# Patient Record
Sex: FEMALE | Race: AMERICAN INDIAN OR ALASKA NATIVE
[De-identification: names, ages, dates, MRNs, and addresses within clinical notes are randomized per-mention and may not be internally consistent; named-entity substitution may affect disease eponyms.]

---

## 2019-06-13 ENCOUNTER — HOSPITAL ENCOUNTER (EMERGENCY)
Dept: HOSPITAL 5 - ED | Age: 29
Discharge: HOME | End: 2019-06-13
Payer: MEDICAID

## 2019-06-13 VITALS — SYSTOLIC BLOOD PRESSURE: 149 MMHG | DIASTOLIC BLOOD PRESSURE: 91 MMHG

## 2019-06-13 DIAGNOSIS — I10: ICD-10-CM

## 2019-06-13 DIAGNOSIS — R04.0: Primary | ICD-10-CM

## 2019-06-13 LAB
APTT BLD: 28 SEC. (ref 24.2–36.6)
BASOPHILS # (AUTO): 0 K/MM3 (ref 0–0.1)
BASOPHILS NFR BLD AUTO: 1 % (ref 0–1.8)
EOSINOPHIL # BLD AUTO: 0 K/MM3 (ref 0–0.4)
EOSINOPHIL NFR BLD AUTO: 1 % (ref 0–4.3)
HCT VFR BLD CALC: 37.4 % (ref 30.3–42.9)
HGB BLD-MCNC: 12.8 GM/DL (ref 10.1–14.3)
INR PPP: 1.09 (ref 0.87–1.13)
LYMPHOCYTES # BLD AUTO: 1.3 K/MM3 (ref 1.2–5.4)
LYMPHOCYTES NFR BLD AUTO: 29.7 % (ref 13.4–35)
MCHC RBC AUTO-ENTMCNC: 34 % (ref 30–34)
MCV RBC AUTO: 113 FL (ref 79–97)
MONOCYTES # (AUTO): 0.5 K/MM3 (ref 0–0.8)
MONOCYTES % (AUTO): 12 % (ref 0–7.3)
PLATELET # BLD: 256 K/MM3 (ref 140–440)
RBC # BLD AUTO: 3.32 M/MM3 (ref 3.65–5.03)

## 2019-06-13 PROCEDURE — 85730 THROMBOPLASTIN TIME PARTIAL: CPT

## 2019-06-13 PROCEDURE — 85610 PROTHROMBIN TIME: CPT

## 2019-06-13 PROCEDURE — 85025 COMPLETE CBC W/AUTO DIFF WBC: CPT

## 2019-06-13 PROCEDURE — 99283 EMERGENCY DEPT VISIT LOW MDM: CPT

## 2019-06-13 PROCEDURE — 36415 COLL VENOUS BLD VENIPUNCTURE: CPT

## 2019-06-13 NOTE — EMERGENCY DEPARTMENT REPORT
HPI





- General


Chief Complaint: Nosebleed


Time Seen by Provider: 06/13/19 16:47





- HPI


HPI: 


29-year-old -American female presents to the emergency department with a 

complaint of a nosebleed that started last night and has been intermittent but 

worsened just prior to presentation.  She says that she is bleeding from the 

right nostril/nasal passage.  She denies any nasal trauma, using any type of 

illicit drugs in which she would've snorted something.  She denies any past 

medical history.  She denies any tobacco or illicit drug use.  She has not taken

anything for her symptoms prior to presentation.  A few different instances, she

says that there was a moderate amount of blood and clots coming out.








ED Past Medical Hx





- Past Medical History


Previous Medical History?: Yes


Hx Hypertension: Yes


Additional medical history: IUD





- Surgical History


Past Surgical History?: No





- Social History


Smoking Status: Never Smoker


Substance Use Type: Alcohol





- Medications


Home Medications: 


                                Home Medications











 Medication  Instructions  Recorded  Confirmed  Last Taken  Type


 


No Known Home Medications [No  06/13/19 06/13/19 Unknown History





Reported Home Medications]     














ED Review of Systems


ROS: 


Stated complaint: NOSE BLEED


Other details as noted in HPI





Comment: All other systems reviewed and negative


Constitutional: denies: chills, fever


Eyes: denies: eye pain, vision change


ENT: epistaxis.  denies: ear pain, throat pain


Respiratory: denies: cough, shortness of breath


Gastrointestinal: denies: nausea, vomiting


Skin: denies: rash, lesions


Neurological: denies: headache


Hematological/Lymphatic: easy bleeding (epistaxis).  denies: easy bruising





Physical Exam





- Physical Exam


Vital Signs: 


                                   Vital Signs











  06/13/19 06/13/19 06/13/19





  16:49 17:30 18:00


 


Temperature   


 


Pulse Rate 98 H 94 H 85


 


Respiratory 16 15 16





Rate   


 


Blood Pressure   


 


Blood Pressure 180/121 155/106 147/100





[Right]   


 


O2 Sat by Pulse 99 100 99





Oximetry   














  06/13/19 06/13/19





  18:29 18:34


 


Temperature  98 F


 


Pulse Rate 84 94 H


 


Respiratory 16 15





Rate  


 


Blood Pressure  152/99


 


Blood Pressure 161/100 





[Right]  


 


O2 Sat by Pulse 100 100





Oximetry  











Physical Exam: 





GENERAL: The patient is well-developed well-nourished.


HENT: Normocephalic.  Atraumatic.    Patient has moist mucous membranes.  

Oropharynx is clear.  There is a small amount of blood seen in the right nasal 

passage.


EYES: Extraocular motions are intact.  Pupils equal reactive to light 

bilaterally.


NECK: Supple.  Trachea is midline.


CHEST/LUNGS: Clear to auscultation.  There is no respiratory distress noted.


HEART/CARDIOVASCULAR: Regular.  There is no tachycardia.  There is no murmur.


ABDOMEN:  There is no abdominal distention.


SKIN: Skin is warm and dry.


NEURO: The patient is awake, alert, and oriented.  The patient is cooperative.  

The patient has no focal neurologic deficits.  The patient has normal speech.


MUSCULOSKELETAL: There is no tenderness or deformity. There is no evidence of 

acute injury.





ED Course


                                   Vital Signs











  06/13/19 06/13/19 06/13/19





  16:49 17:30 18:00


 


Temperature   


 


Pulse Rate 98 H 94 H 85


 


Respiratory 16 15 16





Rate   


 


Blood Pressure   


 


Blood Pressure 180/121 155/106 147/100





[Right]   


 


O2 Sat by Pulse 99 100 99





Oximetry   














  06/13/19 06/13/19





  18:29 18:34


 


Temperature  98 F


 


Pulse Rate 84 94 H


 


Respiratory 16 15





Rate  


 


Blood Pressure  152/99


 


Blood Pressure 161/100 





[Right]  


 


O2 Sat by Pulse 100 100





Oximetry  














ED Medical Decision Making





- Lab Data


Result diagrams: 


                                 06/13/19 17:26








- Medical Decision Making





This patient presents to the emergency department with a complaint of 

nosebleeds.  There is a small amount of blood seen in the right nasal passage.  

There is no current hemorrhage.  The patient first came to the emergency 

department she had a pretty elevated blood pressure that may be partly to blame 

for the patient denies any history of hypertension but is present with some 

elevated blood pressure.  With her initial blood pressure, it may exacerbate the

 epistaxis.  Originally I was putting in for a dose of labetalol but her blood 

pressure came down to a more reasonable level.  The patient has held pressure 

with her head level for 20 minutes straight.  She was given a dose of aspirin.  

She was reevaluated multiple times and there has been no return of her epistaxis

 and I do not feel that we needed to pack the nasal passage at this time.  We 

discussed dietary and lifestyle changes to make for her blood pressure.  We 

discussed how to hold pressure if necessary for any return of her epistaxis.  

She was given a referral for otolaryngology.  She will return to the ER with any

 worsening of her symptoms or any acute distress.


Critical Care Time: No


Critical care attestation.: 


If time is entered above; I have spent that time in minutes in the direct care 

of this critically ill patient, excluding procedure time.








ED Disposition


Clinical Impression: 


 Epistaxis





Disposition: DC-01 TO HOME OR SELFCARE


Is pt being admited?: No


Condition: Stable


Instructions:  Epistaxis (ED)


Additional Instructions: 


Please follow up with a primary care physician in the next few days.  I have 

also given you a referral for a local ear nose throat physician, Dr. Prasanth Decker. Return to the emergency Department with any worsening of your symptoms or

 any acute distress.





If you start to have another nosebleed, make sure that you will hold pressure 

for 20 minutes with her head level.  If you continue to have bleeding after 

that, he will need to return to the emergency department.





Try and stay away from foods that are high in salt, caffeinated products to help

 with her blood pressure.  Daily blood pressure log.














Referrals: 


JEANE HDEZ MD [Staff Physician] - 3-5 Days


ARLETTE MCDANIEL DO [Staff Physician] - 3-5 Days


[facility] [Outside] - 3-5 Days


Time of Disposition: 19:36

## 2019-06-13 NOTE — EMERGENCY DEPARTMENT REPORT
Blank Doc





- Documentation


Documentation: 





This is a 29-year-old female that presents with acute nosebleed.  Denies any h

eadache.  





This initial assessment/diagnostic orders/clinical plan/treatment(s) is/are 

subject to change based on patient's health status, clinical progression and re-

assessment by fellow clinical providers in the ED.  Further treatment and workup

at subsequent clinical providers discretion.  Patient/guardians urged not to 

elope from the ED as their condition may be serious if not clinically assessed 

and managed.  Initial orders include:


1- Patient sent to MAIN ED for further evaluation and treatment

## 2019-06-14 ENCOUNTER — HOSPITAL ENCOUNTER (EMERGENCY)
Dept: HOSPITAL 5 - ED | Age: 29
LOS: 1 days | Discharge: HOME | End: 2019-06-15
Payer: MEDICAID

## 2019-06-14 DIAGNOSIS — I10: ICD-10-CM

## 2019-06-14 DIAGNOSIS — R04.0: Primary | ICD-10-CM

## 2019-06-15 ENCOUNTER — HOSPITAL ENCOUNTER (EMERGENCY)
Dept: HOSPITAL 5 - ED | Age: 29
Discharge: HOME | End: 2019-06-15
Payer: MEDICAID

## 2019-06-15 VITALS — SYSTOLIC BLOOD PRESSURE: 154 MMHG | DIASTOLIC BLOOD PRESSURE: 89 MMHG

## 2019-06-15 VITALS — DIASTOLIC BLOOD PRESSURE: 100 MMHG | SYSTOLIC BLOOD PRESSURE: 180 MMHG

## 2019-06-15 DIAGNOSIS — Z87.891: ICD-10-CM

## 2019-06-15 DIAGNOSIS — I10: ICD-10-CM

## 2019-06-15 DIAGNOSIS — R04.0: Primary | ICD-10-CM

## 2019-06-15 DIAGNOSIS — I16.0: ICD-10-CM

## 2019-06-15 NOTE — EMERGENCY DEPARTMENT REPORT
ED ENT HPI





- General


Chief complaint: Nosebleed


Stated complaint: NOSE BLEED


Time Seen by Provider: 06/14/19 23:56


Source: patient


Mode of arrival: Ambulatory


Limitations: No Limitations





- History of Present Illness


Initial comments: 





Cecil is a 29-year-old female who presents with right-sided nosebleed since 

Monday.  This is her third ER visit.  No trauma to the nose.  No URI symptoms.  

No seasonal allergies.  sHe denies pain.  Does not take any medications.  She 

does not take aspirin or ibuprofen.


MD complaint: epistaxis


-: Sudden, days(s) (4)


Location: nose (right nose)


Severity: mild


Consistency: intermittent


Improves with: cold therapy


Context-Epistaxis: history of similar (first time this week 3 different 

episodes.  ED visits)





- Related Data


                                Home Medications











 Medication  Instructions  Recorded  Confirmed  Last Taken


 


No Known Home Medications [No  06/13/19 06/13/19 Unknown





Reported Home Medications]    











                                    Allergies











Allergy/AdvReac Type Severity Reaction Status Date / Time


 


No Known Allergies Allergy   Verified 04/13/14 03:23














ED Dental HPI





- General


Chief complaint: Nosebleed


Stated complaint: NOSE BLEED


Time Seen by Provider: 06/14/19 23:56


Source: patient


Mode of arrival: Ambulatory


Limitations: No Limitations





- Related Data


                                Home Medications











 Medication  Instructions  Recorded  Confirmed  Last Taken


 


No Known Home Medications [No  06/13/19 06/13/19 Unknown





Reported Home Medications]    











                                    Allergies











Allergy/AdvReac Type Severity Reaction Status Date / Time


 


No Known Allergies Allergy   Verified 04/13/14 03:23














ED Review of Systems


ROS: 


Stated complaint: NOSE BLEED


Other details as noted in HPI





Constitutional: denies: fever, malaise


Respiratory: denies: cough, shortness of breath


Hematological/Lymphatic: denies: easy bleeding





ED Past Medical Hx





- Past Medical History


Previous Medical History?: Yes


Hx Hypertension: Yes


Additional medical history: IUD





- Surgical History


Past Surgical History?: No





- Social History


Smoking Status: Never Smoker


Substance Use Type: None





- Medications


Home Medications: 


                                Home Medications











 Medication  Instructions  Recorded  Confirmed  Last Taken  Type


 


No Known Home Medications [No  06/13/19 06/13/19 Unknown History





Reported Home Medications]     














ED Physical Exam





- General


Limitations: No Limitations


General appearance: alert, in no apparent distress





- Head


Head exam: Present: atraumatic, normocephalic





- ENT


ENT exam: Present: other (small amount of blood in the right nostril no active 

bleeding)





- Neck


Neck exam: Present: normal inspection, full ROM





- Neurological Exam


Neurological exam: Present: alert, oriented X3





- Psychiatric


Psychiatric exam: Present: normal affect, normal mood





- Skin


Skin exam: Present: warm, dry, intact, normal color





ED Medical Decision Making





- Medical Decision Making





Cecil presents with mild epistaxis, given Afrin.  Given reassurance and 

education.  Referred to ENT.


Critical care attestation.: 


If time is entered above; I have spent that time in minutes in the direct care 

of this critically ill patient, excluding procedure time.








ED Disposition


Clinical Impression: 


 Epistaxis





Disposition: DC-01 TO HOME OR SELFCARE


Is pt being admited?: No


Does the pt Need Aspirin: No


Condition: Stable


Instructions:  Epistaxis (ED)


Referrals: 


LATHA MARQUEZ MD [Staff Physician] - 3-5 Days


FREE,LIYAH JAY MD [Staff Physician] - 3-5 Days


JEANE HDEZ MD [Staff Physician] - 3-5 Days


Forms:  Work/School Release Form(ED)